# Patient Record
Sex: MALE | Race: WHITE | URBAN - METROPOLITAN AREA
[De-identification: names, ages, dates, MRNs, and addresses within clinical notes are randomized per-mention and may not be internally consistent; named-entity substitution may affect disease eponyms.]

---

## 2022-09-13 ENCOUNTER — LAB OUTSIDE AN ENCOUNTER (OUTPATIENT)
Dept: URBAN - METROPOLITAN AREA CLINIC 98 | Facility: CLINIC | Age: 77
End: 2022-09-13

## 2022-09-13 ENCOUNTER — OFFICE VISIT (OUTPATIENT)
Dept: URBAN - METROPOLITAN AREA CLINIC 98 | Facility: CLINIC | Age: 77
End: 2022-09-13
Payer: MEDICARE

## 2022-09-13 ENCOUNTER — WEB ENCOUNTER (OUTPATIENT)
Dept: URBAN - METROPOLITAN AREA CLINIC 98 | Facility: CLINIC | Age: 77
End: 2022-09-13

## 2022-09-13 VITALS
TEMPERATURE: 97.6 F | HEART RATE: 99 BPM | WEIGHT: 249 LBS | DIASTOLIC BLOOD PRESSURE: 70 MMHG | SYSTOLIC BLOOD PRESSURE: 137 MMHG

## 2022-09-13 DIAGNOSIS — R74.8 ELEVATED LIVER ENZYMES: ICD-10-CM

## 2022-09-13 DIAGNOSIS — K72.90 HEPATIC ENCEPHALOPATHY: ICD-10-CM

## 2022-09-13 DIAGNOSIS — R18.8 OTHER ASCITES: ICD-10-CM

## 2022-09-13 DIAGNOSIS — K74.60 UNSPECIFIED CIRRHOSIS OF LIVER: ICD-10-CM

## 2022-09-13 DIAGNOSIS — Z12.11 COLON CANCER SCREENING: ICD-10-CM

## 2022-09-13 DIAGNOSIS — D53.9 MACROCYTIC ANEMIA: ICD-10-CM

## 2022-09-13 PROBLEM — 83414005: Status: ACTIVE | Noted: 2022-09-13

## 2022-09-13 PROBLEM — 13920009: Status: ACTIVE | Noted: 2022-09-13

## 2022-09-13 PROCEDURE — 3017F COLORECTAL CA SCREEN DOC REV: CPT | Performed by: INTERNAL MEDICINE

## 2022-09-13 PROCEDURE — G8427 DOCREV CUR MEDS BY ELIG CLIN: HCPCS | Performed by: INTERNAL MEDICINE

## 2022-09-13 PROCEDURE — G8417 CALC BMI ABV UP PARAM F/U: HCPCS | Performed by: INTERNAL MEDICINE

## 2022-09-13 PROCEDURE — 99205 OFFICE O/P NEW HI 60 MIN: CPT | Performed by: INTERNAL MEDICINE

## 2022-09-13 PROCEDURE — G9903 PT SCRN TBCO ID AS NON USER: HCPCS | Performed by: INTERNAL MEDICINE

## 2022-09-13 PROCEDURE — G8482 FLU IMMUNIZE ORDER/ADMIN: HCPCS | Performed by: INTERNAL MEDICINE

## 2022-09-13 RX ORDER — LISINOPRIL 5 MG/1
TABLET ORAL
Qty: 90 TABLET | Status: ACTIVE | COMMUNITY

## 2022-09-13 RX ORDER — METFORMIN HYDROCHLORIDE 750 MG/1
TABLET, EXTENDED RELEASE ORAL
Qty: 180 TABLET | Status: ACTIVE | COMMUNITY

## 2022-09-13 RX ORDER — SPIRONOLACTONE 50 MG/1
1 TABLET TABLET, FILM COATED ORAL ONCE A DAY
Status: ACTIVE | COMMUNITY

## 2022-09-13 RX ORDER — ATORVASTATIN CALCIUM 40 MG/1
TABLET ORAL
Qty: 90 TABLET | Status: ACTIVE | COMMUNITY

## 2022-09-13 RX ORDER — RIFAXIMIN 550 MG/1
TABLET ORAL
Qty: 60 TABLET | Status: ACTIVE | COMMUNITY

## 2022-09-13 NOTE — HPI-TODAY'S VISIT:
PMH of Cirrhosis (only recently dx but seen on imaging at [place] 2021), CAD, PVD At [facility] recently (records reviewed) At Rehab currently, but experiencing HE On review of records, he is only getting XIfaxan once daily.  No Lactulose listed on MAR Taking Aldactone 50mg BID and Lasix 40mg daily Reviewed [facility] records with paracentesis.  No evidence of SBP Continues to have edema and ascites No known cause of liver disease.  Denies significant EtOH in[place]ke in the past No FHx liver disease . Labs 9/8/22: Recent labs with Alb-2.7 LFTs WNL HGB-10.6 with MCV above 110 PLT-134

## 2022-09-14 ENCOUNTER — TELEPHONE ENCOUNTER (OUTPATIENT)
Dept: URBAN - METROPOLITAN AREA CLINIC 6 | Facility: CLINIC | Age: 77
End: 2022-09-14

## 2022-09-14 ENCOUNTER — TELEPHONE ENCOUNTER (OUTPATIENT)
Dept: URBAN - METROPOLITAN AREA CLINIC 98 | Facility: CLINIC | Age: 77
End: 2022-09-14

## 2022-09-15 ENCOUNTER — TELEPHONE ENCOUNTER (OUTPATIENT)
Dept: URBAN - METROPOLITAN AREA CLINIC 98 | Facility: CLINIC | Age: 77
End: 2022-09-15

## 2022-09-16 ENCOUNTER — TELEPHONE ENCOUNTER (OUTPATIENT)
Dept: URBAN - METROPOLITAN AREA CLINIC 98 | Facility: CLINIC | Age: 77
End: 2022-09-16

## 2022-09-16 LAB
FOLATE (FOLIC ACID), SERUM: 4.7
VITAMIN B12: 1408
WRITTEN AUTHORIZATION: (no result)

## 2022-09-19 LAB
A/G RATIO: 0.9
ACTIN (SMOOTH MUSCLE) ANTIBODY: 8
ALBUMIN: 2.7
ALKALINE PHOSPHATASE: 117
ALPHA-1-ANTITRYPSIN, SERUM: 179
ALT (SGPT): 28
ANTINUCLEAR ANTIBODIES, IFA: NEGATIVE
AST (SGOT): 40
BASO (ABSOLUTE): 0
BASOS: 1
BILIRUBIN, TOTAL: 3.3
BUN/CREATININE RATIO: 21
BUN: 16
CALCIUM: 8.3
CARBON DIOXIDE, TOTAL: 20
CERULOPLASMIN: 21.8
CHLORIDE: 99
CREATINE KINASE (CK), MB: 3.8
CREATININE: 0.75
EGFR: 92
EOS (ABSOLUTE): 0
EOS: 1
FERRITIN, SERUM: 323
GGT: 76
GLOBULIN, TOTAL: 3
GLUCOSE: 143
HBSAG SCREEN: NEGATIVE
HEMATOCRIT: 29.2
HEMATOLOGY COMMENTS:: (no result)
HEMOGLOBIN: 9.8
HEP A AB, TOTAL: POSITIVE
HEP B CORE AB, TOT: NEGATIVE
HEPATITIS B SURF AB QUANT: <3.1
HEREDITARY  HEMOCHROMATOSIS: (no result)
IMMATURE CELLS: (no result)
IMMATURE GRANS (ABS): 0
IMMATURE GRANULOCYTES: 0
IMMUNOGLOBULIN A, QN, SERUM: 761
INR: 1.2
LYMPHS (ABSOLUTE): 0.8
LYMPHS: 11
MCH: 38.1
MCHC: 33.6
MCV: 114
MITOCHONDRIAL (M2) ANTIBODY: <20
MONOCYTES(ABSOLUTE): 1
MONOCYTES: 13
NEUTROPHILS (ABSOLUTE): 5.8
NEUTROPHILS: 74
NRBC: (no result)
PHENOTYPE (PI): (no result)
PLATELETS: 143
POTASSIUM: 3.8
PROTEIN, TOTAL: 5.7
PROTHROMBIN TIME: 12.1
RBC: 2.57
RDW: 13.2
SODIUM: 136
T-TRANSGLUTAMINASE (TTG) IGA: <2
WBC: 7.7

## 2022-09-26 ENCOUNTER — TELEPHONE ENCOUNTER (OUTPATIENT)
Dept: URBAN - METROPOLITAN AREA CLINIC 98 | Facility: CLINIC | Age: 77
End: 2022-09-26

## 2022-09-30 ENCOUNTER — DASHBOARD ENCOUNTERS (OUTPATIENT)
Age: 77
End: 2022-09-30

## 2022-10-02 ENCOUNTER — OUT OF OFFICE VISIT (OUTPATIENT)
Dept: URBAN - METROPOLITAN AREA MEDICAL CENTER 24 | Facility: MEDICAL CENTER | Age: 77
End: 2022-10-02
Payer: MEDICARE

## 2022-10-02 DIAGNOSIS — K76.82 ENCEPHALOPATHY, HEPATIC: ICD-10-CM

## 2022-10-02 DIAGNOSIS — E87.1 ACUTE HYPONATREMIA: ICD-10-CM

## 2022-10-02 DIAGNOSIS — K74.69 CIRRHOSIS, CRYPTOGENIC: ICD-10-CM

## 2022-10-02 DIAGNOSIS — R18.8 ABDOMINAL ASCITES: ICD-10-CM

## 2022-10-02 PROCEDURE — G8427 DOCREV CUR MEDS BY ELIG CLIN: HCPCS | Performed by: INTERNAL MEDICINE

## 2022-10-02 PROCEDURE — 99222 1ST HOSP IP/OBS MODERATE 55: CPT | Performed by: INTERNAL MEDICINE

## 2022-10-03 ENCOUNTER — OUT OF OFFICE VISIT (OUTPATIENT)
Dept: URBAN - METROPOLITAN AREA MEDICAL CENTER 24 | Facility: MEDICAL CENTER | Age: 77
End: 2022-10-03
Payer: MEDICARE

## 2022-10-03 DIAGNOSIS — K76.82 ENCEPHALOPATHY, HEPATIC: ICD-10-CM

## 2022-10-03 DIAGNOSIS — K74.69 CIRRHOSIS, CRYPTOGENIC: ICD-10-CM

## 2022-10-03 DIAGNOSIS — R18.8 ABDOMINAL ASCITES: ICD-10-CM

## 2022-10-03 DIAGNOSIS — J94.8 HYDROPNEUMOTHORAX: ICD-10-CM

## 2022-10-03 PROCEDURE — 99232 SBSQ HOSP IP/OBS MODERATE 35: CPT | Performed by: STUDENT IN AN ORGANIZED HEALTH CARE EDUCATION/TRAINING PROGRAM

## 2022-10-11 ENCOUNTER — OFFICE VISIT (OUTPATIENT)
Dept: URBAN - METROPOLITAN AREA TELEHEALTH 2 | Facility: TELEHEALTH | Age: 77
End: 2022-10-11

## 2022-10-11 PROBLEM — 19943007: Status: ACTIVE | Noted: 2022-09-13

## 2022-10-11 PROBLEM — 389026000: Status: ACTIVE | Noted: 2022-09-13

## 2022-10-11 RX ORDER — LISINOPRIL 5 MG/1
TABLET ORAL
Qty: 90 TABLET | Status: ACTIVE | COMMUNITY

## 2022-10-11 RX ORDER — RIFAXIMIN 550 MG/1
TABLET ORAL
Qty: 60 TABLET | Status: ACTIVE | COMMUNITY

## 2022-10-11 RX ORDER — ATORVASTATIN CALCIUM 40 MG/1
TABLET ORAL
Qty: 90 TABLET | Status: ACTIVE | COMMUNITY

## 2022-10-11 RX ORDER — METFORMIN HYDROCHLORIDE 750 MG/1
TABLET, EXTENDED RELEASE ORAL
Qty: 180 TABLET | Status: ACTIVE | COMMUNITY

## 2022-10-11 RX ORDER — SPIRONOLACTONE 50 MG/1
1 TABLET TABLET, FILM COATED ORAL ONCE A DAY
Status: ACTIVE | COMMUNITY

## 2022-10-11 NOTE — HPI-TODAY'S VISIT:
PMH of Cirrhosis (only recently dx but seen on imaging at [place] 2021), CAD, PVD At [facility] recently (records reviewed) At Rehab currently, but experiencing HE On review of records, he is only getting XIfaxan once daily.  No Lactulose listed on MAR Taking Aldactone 50mg BID and Lasix 40mg daily Reviewed [facility] records with paracentesis.  No evidence of SBP Continues to have edema and ascites No known cause of liver disease.  Denies significant EtOH inatke in the past No FHx liver disease . Labs 9/8/22: Recent labs with Alb-2.7 LFTs WNL HGB-10.6 with MCV above 110 PLT-134